# Patient Record
(demographics unavailable — no encounter records)

---

## 2017-03-14 NOTE — DIAGNOSTIC IMAGING REPORT
Clinical Indication: Chest pain with left arm swelling, history of bronchitis 

and

pneumonia



Technique: IV administration nonionic contrast. Spiral acquisition obtained through

the chest. Multiplanar reconstructions generated. Total dose length product 1051

mGycm. CTDIvol(s) 8, 170, 23  mGy



Comparison: 12/3/2009



Findings: Inspiration is less optimal than on the prior exam. Dependent and

compressive atelectatic changes are seen in both lungs bilaterally. Areas of

groundglass opacity in the bilateral perihilar regions also likely represent areas

of atelectasis. No evidence of consolidation, effusion, or congestion or mass.



The heart size is normal. There is no evidence of pericardial effusion. No

mediastinal or hilar mass or adenopathy. The esophagus is unremarkable. The 

included

thyroid is unremarkable. No axillary or chest wall mass or adenopathy. There is a

right chest port catheter, tip of which is at the cavoatrial junction. 

Previously

demonstrated left chest port catheter has been removed. There are some 

tubular

calcifications projecting within the left innominate vein cyst which probably

represent old fibrin sheath calcifications from the previous left chest port

catheter. The bones are unremarkable except for some cervical spine fusion hardware

noted on the highest cuts. Note that since the prior exam, previously demonstrated

left chest port has been replaced with the right chest port.



The included upper abdominal anatomy is unremarkable. The spleen is upper limits 

of

normal in size.



Impression: No acute process



Mild atelectatic changes, as described



Right chest port catheter incidentally noted



The CT scanner at [facility] is accredited by the American College 

of

Radiology and the scans are performed using protocols designed to limit 

radiation

exposure to as low as reasonably achievable to attain images of sufficient

resolution adequate for diagnostic evaluation.

## 2017-03-20 NOTE — DIAGNOSTIC IMAGING REPORT
Indication: Knee pain



Technique: MRI of the right knee was imaged in a 1.5 Rizwana magnet. Pulse 

sequences

obtained include coronal T1 fast spin-echo, STIR, sagittal coronal and axial proton

fast spin-echo with fat saturation, sagittal proton fast spin-echo.



Comparison: 8/25/15



Findings: The menisci appear normal in signal and morphology. There is no evidence

of a meniscal tear. Anterior and posterior cruciate ligaments, medial collateral

ligament and lateral collateral structures are normal. There is no joint effusion.

There is a fabella noted. There is a slightly lobulated intramedullary cystic focus

in the medial femoral condyle again noted unchanged. Extensor tendon mechanism is

normal. Articular cartilage is relatively well preserved.



Impression:



No internal derangement.



Small cystic structure in the medial femoral condyle unchanged from 2015.

## 2017-08-07 NOTE — DIAGNOSTIC IMAGING REPORT
Indication: Back pain



Technique: MRI examination of the cervical spine was performed in a 1.5 Rizwana

magnet. Sequences obtained include sagittal and axial T1 and T2 fast spin echo, 

and

sagittal STIR. Post gadolinium axial and sagittal T1 fast spin-echo with fat

saturation obtained.



Comparison: none



Findings: There is susceptibility artifact associated with prosthetic discs at 

C3-4,

C4-5, C5-6, C6-7. Anterior fusion hardware noted at C3, C4 and C5. There is no

central stenosis. There is no evidence of neural foraminal narrowing. The spinal

cord is normal in appearance. No abnormal fluid collections identified. Bone 

marrow

signal is normal as visualized. Facet and uncovertebral hypertrophy noted at

multiple levels.



Impression:



Multilevel cervical discectomy and fusion as described above. No neural or 

cord

impingement identified.

## 2017-08-08 NOTE — DIAGNOSTIC IMAGING REPORT
Indication: Abdominal pain



Technique: Continuous helical transaxial imaging of the abdomen and pelvis was

obtained from the lung bases to the pubic symphysis during intravenous 

contrast

administration. Coronal 2-D reformats were also obtained. Study obtained in a

Siemens sensation 64 slice CT.



Total Dose length Product (DLP):  970 mGycm



CT Dose Index Volume (CTDIvol):   18.6 mGy



Comparison: 11/24/13



Findings: There is trace basilar atelectasis. The liver is low-attenuation

consistent with fatty infiltration. Small hiatal hernia is present. Accessory 

spleen

noted. Pancreas is unremarkable. The gallbladder is unremarkable. There is no

adrenal mass. No evidence of bowel obstruction, free fluid or free air. Normal

appendix demonstrated. The bladder is somewhat distended. Bilateral hip prostheses

with resultant artifact limiting evaluation of the pelvis. Mild arterial

calcification noted throughout the abdominal aorta iliac vessels.



Impression:



No acute findings appreciated.



Fatty liver



Bilateral total hip prostheses



Atherosclerotic vascular disease



Small hiatal hernia







The CT scanner at [facility] is accredited by the American College 

of

Radiology and the scans are performed using dose optimization techniques as

appropriate to a performed exam including Automatic Exposure control.

## 2017-11-20 NOTE — DIAGNOSTIC IMAGING REPORT
Indication: Bilateral lower extremity radiculopathy



Technique: Sagittal T1 and T2 fast spin echo, sagittal STIR, axial T1 and T2 

fast

spin-echo images of the lumbar spine



Comparison: 4/10/2012



Findings: Bony alignment is normal. Vertebral body heights are preserved. Disc

spaces are preserved. Conus medullaris terminates at the L1-2 disc level. 

Vertebral

marrow signal is normal.



At T12-L1, facet hypertrophy results in very mild neural foraminal narrowing on the

left. No significant disc bulge or protrusion or spinal stenosis.



At L1-L2, facet hypertrophy results in minimal neural foraminal narrowing on the

left. No significant disc bulge or protrusion or spinal stenosis.



At L2-3, there is bilateral facet hypertrophy, but no significant neural 

foraminal

narrowing. No spinal stenosis or significant disc bulge or protrusion.



At L3-4, facet hypertrophy results minimal neural foraminal narrowing bilaterally.

No significant distal or protrusion or spinal stenosis.



At L4-5, there is facet hypertrophy, resulting in mild neural foraminal 

narrowing

bilaterally. There is minimal circumferential annular bulge, which does not

significant narrow the spinal canal.



At L5-S1, facet hypertrophy results in minimal narrowing of the bilateral 

neural

foramina. There is minimal circumferential annular bulge, which does not

significantly narrow the spinal canal.



The included extraspinal soft tissues are unremarkable



Compared to the prior exam, no significant interim change



Impression: Minimal degenerative changes, as detailed above



No evidence of significant neural impingement



No acute bony trauma

## 2018-01-08 NOTE — DIAGNOSTIC IMAGING REPORT
Indication: Congestion and sinusitis.

 

Technique: Continuous helical transaxial imaging of the maxillofacial structures

obtained without intravenous contrast administration. Coronal 2-D reformats were also

obtained. Study obtained in a Siemens sensation 64 slice CT.  Automatic Exposure

Control was utilized.

 

 

Total Dose length Product (DLP):  602.81 mGycm

 

CT Dose Index Volume (CTDIvol):   28.19 mGy

 

Comparison: None

 

Findings: The ostiomeatal units are widely patent bilaterally. The windows appear

widened and this is likely on the basis of previous partial medial maxillectomies.

There is mucosal thickening lining the right maxillary sinus as well as portions of

the ethmoid sinus.

 

There is an old fracture involving the floor of the right orbit which is absent.

Portion of the intraorbital fat is herniated into the upper aspect of the right

maxillary antrum.

 

There is no acute fracture. The orbits are unremarkable. Anterior fusion hardware

noted within the visualized part of the upper cervical spine.

 

IMPRESSION:

 

Chronic sinusitis as described above.

 

Status post bilateral medial maxillectomies.

 

Stigmata of a old right orbital fracture as discussed above.

 

Cervical fusion

 

 

 

The CT scanner at [facility] is accredited by the American College of

Radiology and the scans are performed using dose optimization techniques as

appropriate to a performed exam including Automatic Exposure control.

## 2018-02-05 NOTE — DIAGNOSTIC IMAGING REPORT
Indication: 57-year-old male with TIAs

 

Technique: sagittal T1 fast spin echo, axial T1 FLAIR, axial T2 FLAIR, axial T2 FS

PROPELLER, axial T2* GRE, axial diffusion weighted images. ADC and exponential ADC

maps generated

 

Comparison: 2/15/2013

 

Findings: No abnormal areas of restricted diffusion to suggest acute infarction. No

acute hemorrhage or edema. No mass effect nor midline shift. Small lacunar infarcts

versus prominent perivascular spaces in the left basal ganglia. The vascular flow

voids are preserved. There is mild prominence to the ventricles and extra axial CSF

spaces. There is a questionably left temporal encephalomalacia with widening of the

sylvian fissures. A small old subcortical infarct is seen in the posterior left

frontal lobe there is encephalomalacia of the parasagittal right frontal lobe again

demonstrated.. This is more clearly evident than on the prior exam. There is right

maxillary sinus mucosal thickening.

 

Impression: Negative for acute intracranial bleed, mass effect, or infarct

 

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

 

Sinus disease

## 2018-09-25 NOTE — DIAGNOSTIC IMAGING REPORT
Indication: Chronic low back pain

 

Technique: Sagittal T1 and T2 fast spin echo, sagittal STIR, axial T1 and T2 fast

spin-echo images of the lumbar spine

 

Comparison: 11/20/2017

 

Findings: Vertebral body heights are preserved. Vertebral body marrow signal is

preserved. The disc spaces are preserved. There is slight disc desiccation at L2-3.

The remaining disc signal is normal. The conus medullaris terminates at the top of

L2.

 

At T12-L1, minimal facet hypertrophy results in minimal neural foraminal stenosis on

the left. No significant disc bulge or protrusion or spinal stenosis.

 

At L1-2, minimal facet hypertrophy results in minimal neural foraminal narrowing on

the left. No significant disc bulge or protrusion or spinal stenosis.

 

At L2-3, no significant disc bulge or protrusion. There is minimal bilateral facet

arthrosis again demonstrated. No significant neural foraminal stenosis.

 

At L3-4, there is very mild facet arthrosis. No significant neural foraminal

stenosis. No significant disc bulge or protrusion or spinal stenosis.

 

At L4-5, bilateral facet arthrosis results in mild bilateral neural foraminal

stenosis. Minimal circumferential annular bulge again noted, does not result in any

significant spinal canal stenosis.

 

At L5-S1, there is bilateral facet arthrosis, resulting in very mild neural foraminal

stenosis. No significant disc bulge or protrusion or spinal canal stenosis.

 

Included extra spinal soft tissues are unremarkable.

 

Impression: Minimal multilevel degenerative changes, as detailed above, unchanged

from prior exam of 11/20/2017. No evidence of significant neural impingement.

 

No acute bony trauma

## 2019-08-27 NOTE — DIAGNOSTIC IMAGING REPORT
Indication: Left knee pain. Known fracture of the fibula.

 

Technique: MRI of the left knee was imaged in a 1.5 Rizwana magnet. Pulse sequences

obtained include coronal T1 fast spin-echo, STIR, sagittal coronal and axial proton

fast spin-echo with fat saturation, sagittal proton fast spin-echo.

 

Comparison: 8/25/2015 MRI left knee. No plain film correlation.

 

Findings: There is minimal reticular T2 signal within the subcutaneous tissue in the

lateral part of the knee. There is a horizontally oriented linear low T1/high T2

signal focus within the fibular head consistent with a nondisplaced fracture. There

is mild ill-defined bone marrow edema associated with the fracture. Fracture may be

acute to subacute. Correlate clinically.

 

There is no joint effusion. Articular cartilage is normal well-preserved throughout

the all 3 compartments. The medial and lateral menisci appear normal in size,

contour, morphology. Anterior and posterior cruciate ligaments are normal.

 

IMPRESSION:

 

Acute to subacute fracture of the fibular head without displacement. Suggest

correlation with plain x-ray. Bone marrow edema and adjacent subcutaneous edema

noted.

## 2019-12-02 NOTE — DIAGNOSTIC IMAGING REPORT
Indication: Orbital and maxillofacial congestion and pain

 

Technique: Continuous helical transaxial imaging of the orbits/maxillofacial

structures obtained without intravenous contrast administration. Coronal 2-D

reformats were also obtained. Study obtained in a Siemens sensation 64 slice CT. 

Automatic Exposure Control was utilized.

 

 

Total Dose length Product (DLP):  680 mGycm

 

CT Dose Index Volume (CTDIvol):   25 mGy

 

Comparison: None

 

Findings: There is a thickening of the wall the right maxillary sinus which is

moderately opacified. Findings consistent with chronic sinusitis and osteitis. There

are small submental and submandibular lymph nodes present which are nonspecific. The

ethmoid sinus and frontal sinus appear clear. There is a anterior cervical fusion

hardware C3, C4 and C5 demonstrated on this exam. The hardware and the extent of the

surgery as below the field-of-view of this examination.

 

Impression:

 

Chronic right maxillary sinusitis and osteitis.

 

Anterior cervical discectomy fusion

 

The CT scanner at [facility] [place] is accredited by the American College of

Radiology and the scans are performed using dose optimization techniques as

appropriate to a performed exam including Automatic Exposure control.

## 2020-06-26 NOTE — DIAGNOSTIC IMAGING REPORT
EXAM: MRI  MRI Brain no Contrast

 

COMPARISON: None

 

HISTORY: Altered mental status

 

TECHNIQUE: MR scan of the brain includes sagittal T1, axial T1, T2, FLAIR,

diffusion-weighted and gradient sequences. 

 

FINDINGS:

 

Unfortunately the study is essentially nondiagnostic severely degraded by severe

metal artifact. Patient has 3 metal piercings in the facial area which he stated he

could not remove.

 

IMPRESSION: 

 

NONDIAGNOSTIC STUDY SEVERELY DEGRADED BY METAL ARTIFACT FROM FACIAL PIERCINGS.